# Patient Record
Sex: FEMALE | Race: WHITE | NOT HISPANIC OR LATINO
[De-identification: names, ages, dates, MRNs, and addresses within clinical notes are randomized per-mention and may not be internally consistent; named-entity substitution may affect disease eponyms.]

---

## 2023-06-02 PROBLEM — Z00.00 ENCOUNTER FOR PREVENTIVE HEALTH EXAMINATION: Status: ACTIVE | Noted: 2023-06-02

## 2023-06-19 ENCOUNTER — APPOINTMENT (OUTPATIENT)
Dept: ORTHOPEDIC SURGERY | Facility: CLINIC | Age: 33
End: 2023-06-19
Payer: COMMERCIAL

## 2023-06-19 VITALS — BODY MASS INDEX: 25.76 KG/M2 | WEIGHT: 140 LBS | HEIGHT: 62 IN

## 2023-06-19 DIAGNOSIS — M77.01 MEDIAL EPICONDYLITIS, RIGHT ELBOW: ICD-10-CM

## 2023-06-19 DIAGNOSIS — Z78.9 OTHER SPECIFIED HEALTH STATUS: ICD-10-CM

## 2023-06-19 PROCEDURE — 73080 X-RAY EXAM OF ELBOW: CPT | Mod: RT

## 2023-06-19 PROCEDURE — 99203 OFFICE O/P NEW LOW 30 MIN: CPT

## 2023-06-19 NOTE — HISTORY OF PRESENT ILLNESS
[Right Arm] : right arm [Gradual] : gradual [5] : 5 [2] : 2 [Dull/Aching] : dull/aching [Localized] : localized [Shooting] : shooting [Intermittent] : intermittent [Rest] : rest [de-identified] : 06/19/2023  pt presents here today with right elbow pain for about 1 month\par no injury\par no treatment so far  [] : no [FreeTextEntry1] : right elbow  [FreeTextEntry5] : no injury  [de-identified] : with activity  [de-identified] : nothing

## 2023-06-19 NOTE — IMAGING
[Right] : right elbow [There are no fractures, subluxations or dislocations. No significant abnormalities are seen] : There are no fractures, subluxations or dislocations. No significant abnormalities are seen [de-identified] : right elbow\par No swelling, no ecchymosis\par Tenderness to palpation over lateral epicondyle\par Full elbow flexion, extension, supination, pronation\par 5/5 strength in flexion, extension, supination, pronation\par Lateral Elbow pain with resisted wrist extension\par No Varus or Valgus instability\par Motor and sensory intact distally\par

## 2023-06-19 NOTE — ASSESSMENT
[FreeTextEntry1] : ATRAUMATIC LATERAL RIGHT ELBOW PAIN X 1 MONTH\par XRAYS NEGATIVE\par ACTIVITY MODIFICATION, HEP, REST, VOLTAREN DISCUSSED\par CSI DISCUSSED, PT DECLINED\par \par -The patient's diagnosis and treatments options were reviewed in detail.\par -Expect this to be a chronic issue sometimes lasting over a year and necessitating treatment during that time.\par -Trial of home exercises is advised, a pamphlet was handed to the patient with further instructions.\par -Prescription NSAIDs including Voltaren gel also discussed. \par -Proper gripping and lifting reviewed in detail; avoid strenuous repetitive lifting.\par -Discussed need for injections if pain worsens.\par -Discussed surgical repair for refractory cases.\par -Will monitor progression closely.\par \par

## 2024-02-15 ENCOUNTER — APPOINTMENT (OUTPATIENT)
Dept: ORTHOPEDIC SURGERY | Facility: CLINIC | Age: 34
End: 2024-02-15

## 2024-07-22 ENCOUNTER — APPOINTMENT (OUTPATIENT)
Dept: ORTHOPEDIC SURGERY | Facility: CLINIC | Age: 34
End: 2024-07-22
Payer: COMMERCIAL

## 2024-07-22 VITALS — BODY MASS INDEX: 23 KG/M2 | HEIGHT: 62 IN | WEIGHT: 125 LBS

## 2024-07-22 DIAGNOSIS — M54.2 CERVICALGIA: ICD-10-CM

## 2024-07-22 DIAGNOSIS — M62.838 OTHER MUSCLE SPASM: ICD-10-CM

## 2024-07-22 PROCEDURE — 99214 OFFICE O/P EST MOD 30 MIN: CPT

## 2024-07-22 PROCEDURE — 72040 X-RAY EXAM NECK SPINE 2-3 VW: CPT

## 2024-07-22 PROCEDURE — 73010 X-RAY EXAM OF SHOULDER BLADE: CPT | Mod: 50

## 2024-07-22 PROCEDURE — 73030 X-RAY EXAM OF SHOULDER: CPT | Mod: 50

## 2024-07-22 RX ORDER — METHYLPREDNISOLONE 4 MG/1
4 TABLET ORAL
Qty: 1 | Refills: 0 | Status: ACTIVE | COMMUNITY
Start: 2024-07-22 | End: 1900-01-01

## 2024-07-22 NOTE — HISTORY OF PRESENT ILLNESS
[5] : 5 [1] : 2 [Sharp] : sharp [Intermittent] : intermittent [Sleep] : sleep [Rest] : rest [de-identified] : 7.22.24 Patient here for bilateral shoulder pain. left is worse than right. Pain started a week ago, no injury. Her right hand is her dominate hand [de-identified] : lifting, range of motion

## 2024-07-22 NOTE — ASSESSMENT
[FreeTextEntry1] : Atraumatic bilateral shoulder pain. Exam mostly cervical in nature with some limits in ROM and trapezial tenderness and spasm. Shoulder exam with preserved ROM and strength. Will start in a course of physical therapy for neck. MDP prescription sent to pharmacy to alleviate some inflammation. RTC 6 weeks, consider MRI and referral to pain management if no improvement.

## 2024-07-22 NOTE — IMAGING
[Straightening consistent with spasm] : Straightening consistent with spasm [Bilateral] : shoulder bilaterally [There are no fractures, subluxations or dislocations. No significant abnormalities are seen] : There are no fractures, subluxations or dislocations. No significant abnormalities are seen [de-identified] : Bilateral Shoulder Exam:  No swelling, no ecchymosis, no dirk deformity Tenderness to palpation over greater tuberosity No instability or tenderness over AC joint Full range of motion with no pain 5/5 supraspinatus, infraspinatus and subscapularis; there is no pain with strength testing Speeds and yergason negative Motor and sensory intact distally  Neck Exam: Inspection: No swelling, no ecchymosis Palpation: Trapezial and paracervical tenderness to palpation Range of motion: Full ROM with stiffness at end ROM Strength: 5/5 deltoid, biceps, triceps and wrist flexors/extensors Special testing: Equivocal spurling, negative garcia Reflexes +2, intact motor distally NVID

## 2024-08-19 ENCOUNTER — APPOINTMENT (OUTPATIENT)
Dept: ORTHOPEDIC SURGERY | Facility: CLINIC | Age: 34
End: 2024-08-19
Payer: COMMERCIAL

## 2024-08-19 VITALS — WEIGHT: 120 LBS | HEIGHT: 62 IN | BODY MASS INDEX: 22.08 KG/M2

## 2024-08-19 DIAGNOSIS — M54.2 CERVICALGIA: ICD-10-CM

## 2024-08-19 PROCEDURE — 99213 OFFICE O/P EST LOW 20 MIN: CPT

## 2024-08-19 NOTE — ASSESSMENT
[FreeTextEntry1] : Persistent trapezial and b/l shoulder pain. Seems to still be cervical in nature. She is awaiting pregnancy status so would like to hold off on any type of treatment at this time. She will contact office about a cervical MRI to further evaluate worsening HNP and eventual referral to pain management. RTC as needed at this time.

## 2024-08-19 NOTE — HISTORY OF PRESENT ILLNESS
[5] : 5 [1] : 2 [Sharp] : sharp [Intermittent] : intermittent [Sleep] : sleep [Rest] : rest [de-identified] : 7.22.24 Patient here for bilateral shoulder pain. left is worse than right. Pain started a week ago, no injury. Her right hand is her dominate hand  8/19/24- here to follow up on kameron shoulders and neck. completed PT with some relief. MDP did not help much.  [de-identified] : lifting, range of motion

## 2024-08-19 NOTE — IMAGING
[de-identified] : Bilateral Shoulder Exam:  No swelling, no ecchymosis, no dirk deformity Tenderness to palpation over greater tuberosity No instability or tenderness over AC joint Full range of motion with no pain 5/5 supraspinatus, infraspinatus and subscapularis; there is no pain with strength testing Speeds and yergason negative Motor and sensory intact distally  Neck Exam: Inspection: No swelling, no ecchymosis Palpation: Trapezial and paracervical tenderness to palpation Range of motion: Full ROM with stiffness at end ROM Strength: 5/5 deltoid, biceps, triceps and wrist flexors/extensors Special testing: Equivocal spurling, negative garcia Reflexes +2, intact motor distally NVID

## 2024-09-10 ENCOUNTER — RESULT REVIEW (OUTPATIENT)
Age: 34
End: 2024-09-10

## 2024-12-29 ENCOUNTER — NON-APPOINTMENT (OUTPATIENT)
Age: 34
End: 2024-12-29

## 2025-01-08 ENCOUNTER — NON-APPOINTMENT (OUTPATIENT)
Age: 35
End: 2025-01-08